# Patient Record
Sex: FEMALE | Race: WHITE
[De-identification: names, ages, dates, MRNs, and addresses within clinical notes are randomized per-mention and may not be internally consistent; named-entity substitution may affect disease eponyms.]

---

## 2017-09-06 ENCOUNTER — HOSPITAL ENCOUNTER (EMERGENCY)
Dept: HOSPITAL 17 - NEPD | Age: 57
Discharge: HOME | End: 2017-09-06
Payer: SELF-PAY

## 2017-09-06 VITALS — BODY MASS INDEX: 28.34 KG/M2 | HEIGHT: 66 IN | WEIGHT: 176.37 LBS

## 2017-09-06 VITALS
DIASTOLIC BLOOD PRESSURE: 66 MMHG | OXYGEN SATURATION: 98 % | HEART RATE: 86 BPM | TEMPERATURE: 98.4 F | SYSTOLIC BLOOD PRESSURE: 133 MMHG | RESPIRATION RATE: 20 BRPM

## 2017-09-06 VITALS
TEMPERATURE: 98.8 F | SYSTOLIC BLOOD PRESSURE: 127 MMHG | HEART RATE: 71 BPM | RESPIRATION RATE: 14 BRPM | OXYGEN SATURATION: 96 % | DIASTOLIC BLOOD PRESSURE: 62 MMHG

## 2017-09-06 DIAGNOSIS — R51: ICD-10-CM

## 2017-09-06 DIAGNOSIS — Z88.8: ICD-10-CM

## 2017-09-06 DIAGNOSIS — F32.9: ICD-10-CM

## 2017-09-06 DIAGNOSIS — F41.9: ICD-10-CM

## 2017-09-06 DIAGNOSIS — I10: ICD-10-CM

## 2017-09-06 DIAGNOSIS — K52.9: Primary | ICD-10-CM

## 2017-09-06 DIAGNOSIS — K21.9: ICD-10-CM

## 2017-09-06 LAB
ALP SERPL-CCNC: 80 U/L (ref 45–117)
ALT SERPL-CCNC: 28 U/L (ref 10–53)
ANION GAP SERPL CALC-SCNC: 9 MEQ/L (ref 5–15)
AST SERPL-CCNC: 39 U/L (ref 15–37)
BASOPHILS # BLD AUTO: 0 TH/MM3 (ref 0–0.2)
BASOPHILS NFR BLD: 1 % (ref 0–2)
BILIRUB SERPL-MCNC: 0.4 MG/DL (ref 0.2–1)
BUN SERPL-MCNC: 9 MG/DL (ref 7–18)
C. DIFF EPI 027: (no result)
CHLORIDE SERPL-SCNC: 112 MEQ/L (ref 98–107)
COLOR UR: YELLOW
COMMENT (UR): (no result)
CULTURE IF INDICATED: (no result)
EOSINOPHIL # BLD: 0.1 TH/MM3 (ref 0–0.4)
EOSINOPHIL NFR BLD: 3.3 % (ref 0–4)
ERYTHROCYTE [DISTWIDTH] IN BLOOD BY AUTOMATED COUNT: 13.6 % (ref 11.6–17.2)
GFR SERPLBLD BASED ON 1.73 SQ M-ARVRAT: 53 ML/MIN (ref 89–?)
GLUCOSE UR STRIP-MCNC: (no result) MG/DL
HCO3 BLD-SCNC: 19.5 MEQ/L (ref 21–32)
HCT VFR BLD CALC: 37.4 % (ref 35–46)
HEMO FLAGS: (no result)
HGB UR QL STRIP: (no result)
HYALINE CASTS #/AREA URNS LPF: 1 /LPF
KETONES UR STRIP-MCNC: 40 MG/DL
LYMPHOCYTES # BLD AUTO: 1 TH/MM3 (ref 1–4.8)
LYMPHOCYTES NFR BLD AUTO: 26.9 % (ref 9–44)
MCH RBC QN AUTO: 30 PG (ref 27–34)
MCHC RBC AUTO-ENTMCNC: 32.2 % (ref 32–36)
MCV RBC AUTO: 93.3 FL (ref 80–100)
MONOCYTES NFR BLD: 11.8 % (ref 0–8)
NEUTROPHILS # BLD AUTO: 2.2 TH/MM3 (ref 1.8–7.7)
NEUTROPHILS NFR BLD AUTO: 57 % (ref 16–70)
NITRITE UR QL STRIP: (no result)
PLATELET # BLD: 261 TH/MM3 (ref 150–450)
POTASSIUM SERPL-SCNC: 4.7 MEQ/L (ref 3.5–5.1)
RBC # BLD AUTO: 4.01 MIL/MM3 (ref 4–5.3)
SODIUM SERPL-SCNC: 140 MEQ/L (ref 136–145)
SP GR UR STRIP: 1.02 (ref 1–1.03)
SQUAMOUS #/AREA URNS HPF: 2 /HPF (ref 0–5)
TRANS CELLS #/AREA URNS HPF: 2 /HPF
WBC # BLD AUTO: 3.8 TH/MM3 (ref 4–11)

## 2017-09-06 PROCEDURE — 81001 URINALYSIS AUTO W/SCOPE: CPT

## 2017-09-06 PROCEDURE — 99284 EMERGENCY DEPT VISIT MOD MDM: CPT

## 2017-09-06 PROCEDURE — 83690 ASSAY OF LIPASE: CPT

## 2017-09-06 PROCEDURE — 96361 HYDRATE IV INFUSION ADD-ON: CPT

## 2017-09-06 PROCEDURE — 96375 TX/PRO/DX INJ NEW DRUG ADDON: CPT

## 2017-09-06 PROCEDURE — 96374 THER/PROPH/DIAG INJ IV PUSH: CPT

## 2017-09-06 PROCEDURE — 87493 C DIFF AMPLIFIED PROBE: CPT

## 2017-09-06 PROCEDURE — 85025 COMPLETE CBC W/AUTO DIFF WBC: CPT

## 2017-09-06 PROCEDURE — 80053 COMPREHEN METABOLIC PANEL: CPT

## 2017-09-06 NOTE — PD
HPI


Chief Complaint:  GI Complaint


Time Seen by Provider:  12:12


Travel History


International Travel<30 days:  No


Contact w/Intl Traveler<30days:  No


Traveled to known affect area:  No





History of Present Illness


HPI


This is a 56-year-old female who presents for evaluation nausea vomiting 

diarrhea.  Symptoms started 5 days ago.  Initially the primary symptoms were 

watery brown diarrhea, 6-7 episodes daily.  Since then she has developed 

vomiting which is now become dry heaving.  The diarrhea has improved and she 

now only has a little bit of diarrhea at a time.  She feels dehydrated.  She 

reports decreased urination.  She denies abdominal pain, fevers or chills, 

flank pain, chest pain or shortness of breath, cough or congestion, vaginal 

bleeding or discharge.  No sick contacts.  No dietary changes.  No recent 

travel.  No recent antibiotic use.  No other complaints.





PFSH


Past Medical History


Anxiety:  Yes


Depression:  Yes


Diabetes:  No


Diminished Hearing:  No


GERD:  Yes


Hypertension:  Yes


Musculoskeletal:  Yes (HERNIATED DISC-NECK)


Immunizations Current:  Yes


Ulcer:  Yes (HX OF BLEEDING ULCER)


Tetanus Vaccination:  Unknown


Pregnant?:  Not Pregnant


Menopausal:  Yes


:  7


Para:  4


Miscarriage:  3


Ectopic Pregnancy:  Yes


Ovarian Cysts:  Yes


Tubal Ligation:  Yes





Past Surgical History


Abdominal Surgery:  Yes (explor lap)


 Section:  No


Gynecologic Surgery:  Yes (AS NOTED IN GYN)


Hysterectomy:  No





Social History


Alcohol Use:  No


Tobacco Use:  No


Substance Use:  No





Allergies-Medications


(Allergen,Severity, Reaction):  


Coded Allergies:  


     diatrizoate meglumine (Unverified  Allergy, Intermediate, SWELLING, 17)


     gadobenic acid (Unverified  Allergy, Intermediate, SWELLING, 17)


     gadodiamide (Unverified  Allergy, Intermediate, SWELLING, 17)


     gadoteridol (Unverified  Allergy, Intermediate, SWELLING, 17)


     iodixanol (Unverified  Allergy, Intermediate, SWELLING, 17)


     iohexol (Unverified  Allergy, Intermediate, SWELLING, 17)


Reported Meds & Prescriptions





Reported Meds & Active Scripts


Active


Zofran (Ondansetron HCl) 4 Mg Tab 4 Mg PO Q6HR PRN








Review of Systems


Except as stated in HPI:  all other systems reviewed are Neg





Physical Exam


Narrative


GENERAL: Well-developed well-nourished female in no acute distress


SKIN: Warm and dry.


HEAD: Atraumatic. Normocephalic. 


EYES: Pupils equal and round. No scleral icterus. No injection or drainage. 


ENT: No nasal bleeding or discharge.  Mucous membranes pink and moist.


NECK: Trachea midline. No JVD. 


CARDIOVASCULAR: Regular rate and rhythm.  No murmur appreciated.


RESPIRATORY: No accessory muscle use. Clear to auscultation. Breath sounds 

equal bilaterally. 


GASTROINTESTINAL: Abdomen soft, non-tender, nondistended. Hepatic and splenic 

margins not palpable. 


MUSCULOSKELETAL: No obvious deformities.  No edema.


NEUROLOGICAL: Awake and alert. No obvious cranial nerve deficits.  Motor 

grossly within normal limits. Normal speech.


PSYCHIATRIC: Appropriate mood and affect; insight and judgment normal.





Data


Data


Last Documented VS





Vital Signs








  Date Time  Temp Pulse Resp B/P (MAP) Pulse Ox O2 Delivery O2 Flow Rate FiO2


 


17 12:57 98.8 71 14 127/62 (83) 96 Room Air  








Orders





 Orders


Complete Blood Count With Diff (17 12:22)


Comprehensive Metabolic Panel (17 12:22)


Lipase (17 12:22)


Urinalysis - C+S If Indicated (17 12:22)


Iv Access Insert/Monitor (17 12:22)


Ecg Monitoring (17 12:22)


Oximetry (17 12:22)


Ondansetron Inj (Zofran Inj) (17 12:30)


Sodium Chlor 0.9% 1000 Ml Inj (Ns 1000 M (17 12:22)


Sodium Chloride 0.9% Flush (Ns Flush) (17 12:30)


C Diff Toxin Pcr (17 12:22)


Enteric Path (Stool) (17 12:22)


Sodium Chlor 0.9% 1000 Ml Inj (Ns 1000 M (17 12:26)


Oral Rehydration (17 13:27)


Acetaminophen (Tylenol) (17 13:45)


Metoclopramide Inj (Reglan Inj) (17 14:00)


Tramadol (Ultram) (17 14:15)





Labs





Laboratory Tests








Test


  9/6/17


12:34 17


12:35 17


12:55


 


White Blood Count 3.8 TH/MM3   


 


Red Blood Count 4.01 MIL/MM3   


 


Hemoglobin 12.0 GM/DL   


 


Hematocrit 37.4 %   


 


Mean Corpuscular Volume 93.3 FL   


 


Mean Corpuscular Hemoglobin 30.0 PG   


 


Mean Corpuscular Hemoglobin


Concent 32.2 % 


  


  


 


 


Red Cell Distribution Width 13.6 %   


 


Platelet Count 261 TH/MM3   


 


Mean Platelet Volume 8.6 FL   


 


Neutrophils (%) (Auto) 57.0 %   


 


Lymphocytes (%) (Auto) 26.9 %   


 


Monocytes (%) (Auto) 11.8 %   


 


Eosinophils (%) (Auto) 3.3 %   


 


Basophils (%) (Auto) 1.0 %   


 


Neutrophils # (Auto) 2.2 TH/MM3   


 


Lymphocytes # (Auto) 1.0 TH/MM3   


 


Monocytes # (Auto) 0.4 TH/MM3   


 


Eosinophils # (Auto) 0.1 TH/MM3   


 


Basophils # (Auto) 0.0 TH/MM3   


 


CBC Comment DIFF FINAL   


 


Differential Comment    


 


Blood Urea Nitrogen 9 MG/DL   


 


Creatinine 1.08 MG/DL   


 


Random Glucose 98 MG/DL   


 


Total Protein 7.4 GM/DL   


 


Albumin 3.6 GM/DL   


 


Calcium Level 8.7 MG/DL   


 


Alkaline Phosphatase 80 U/L   


 


Aspartate Amino Transf


(AST/SGOT) 39 U/L 


  


  


 


 


Alanine Aminotransferase


(ALT/SGPT) 28 U/L 


  


  


 


 


Total Bilirubin 0.4 MG/DL   


 


Sodium Level 140 MEQ/L   


 


Potassium Level 4.7 MEQ/L   


 


Chloride Level 112 MEQ/L   


 


Carbon Dioxide Level 19.5 MEQ/L   


 


Anion Gap 9 MEQ/L   


 


Estimat Glomerular Filtration


Rate 53 ML/MIN 


  


  


 


 


Lipase 205 U/L   


 


Urine Color   YELLOW 


 


Urine Turbidity   HAZY 


 


Urine pH   6.5 


 


Urine Specific Gravity   1.016 


 


Urine Protein   NEG mg/dL 


 


Urine Glucose (UA)   NEG mg/dL 


 


Urine Ketones   40 mg/dL 


 


Urine Occult Blood   TRACE 


 


Urine Nitrite   NEG 


 


Urine Bilirubin   NEG 


 


Urine Urobilinogen


  


  


  LESS THAN 2.0


MG/DL


 


Urine Leukocyte Esterase   LARGE 


 


Urine RBC   1 /hpf 


 


Urine WBC   2 /hpf 


 


Urine Squamous Epithelial


Cells 


  


  2 /hpf 


 


 


Urine Transitional Epithelial


Cells 


  


  2 /hpf 


 


 


Urine Hyaline Casts   1 /lpf 


 


Microscopic Urinalysis Comment


  


  


  CULT NOT


INDICATED











MDM


Medical Decision Making


Medical Screen Exam Complete:  Yes


Emergency Medical Condition:  Yes


Medical Record Reviewed:  Yes


Differential Diagnosis


Gastroenteritis, colitis, diverticulitis, dehydration, electrolyte abnormality


Narrative Course


56-year-old female here for evaluation of 5 days of nausea vomiting and 

diarrhea.  Her abdomen is soft and nontender.  She is afebrile, not 

tachycardic.  Plan is for basic lab work.  She'll be given 2 L of IV fluids and 

4 mg of Zofran.


Additional Reglan dosage has been ordered.





Upon recheck the patient does feel improved.  Her nausea has significantly 

improved.  She was able to tolerate some oral rehydration.  She requested 

Tylenol for headache.  The patient's C. difficile and stool cultures are 

currently pending.  Her lab work is found to be very reassuring.  Suspect a 

viral gastroenteritis.  The patient will be discharged with prescription for 

Zofran.  We discussed signs and symptoms that weren't returning to the 

emergency room.  She is stable for discharge.  Prior to discharge I received a 

call from microbiology stating that they do not have enough stool specimen to 

perform the stool culture.  They do have enough for the C. difficile PCR.  The 

patient is unable to provide any additional stool at this time.  She is 

encouraged to follow-up with her primary care physician for stool culture if 

symptoms persist.





Diagnosis





 Primary Impression:  


 Gastroenteritis


Departure Forms:  Tests/Procedures, Work Release   Enter return to work date:  

Sep 8, 2017





***Additional Instructions:  


Medication as prescribed.  Slowly advance diet as tolerated.


As discussed, return for any acutely new or worsening symptoms such as 

abdominal pain, fevers, persistent uncontrolled vomiting/diarrhea, dehydration, 

dizziness, severe weakness.


***Med/Other Pt SpecificInfo:  Prescription(s) given


Scripts


Ondansetron (Zofran) 4 Mg Tab


4 MG PO Q6HR Y for NAUSEA OR VOMITING, #20 TAB 0 Refills


   Prov: Mitch Cannon MD         17


Disposition:  01 DISCHARGE HOME


Condition:  Stable











Christian Hussein Sep 6, 2017 12:28

## 2018-01-05 ENCOUNTER — HOSPITAL ENCOUNTER (EMERGENCY)
Dept: HOSPITAL 17 - PHED | Age: 58
Discharge: HOME | End: 2018-01-05
Payer: COMMERCIAL

## 2018-01-05 VITALS
DIASTOLIC BLOOD PRESSURE: 67 MMHG | SYSTOLIC BLOOD PRESSURE: 103 MMHG | OXYGEN SATURATION: 98 % | HEART RATE: 76 BPM | RESPIRATION RATE: 18 BRPM

## 2018-01-05 VITALS — RESPIRATION RATE: 18 BRPM

## 2018-01-05 VITALS — WEIGHT: 156.53 LBS | BODY MASS INDEX: 26.08 KG/M2 | HEIGHT: 65 IN

## 2018-01-05 VITALS
RESPIRATION RATE: 16 BRPM | DIASTOLIC BLOOD PRESSURE: 58 MMHG | SYSTOLIC BLOOD PRESSURE: 113 MMHG | TEMPERATURE: 97.9 F | OXYGEN SATURATION: 98 % | HEART RATE: 103 BPM

## 2018-01-05 VITALS
RESPIRATION RATE: 18 BRPM | DIASTOLIC BLOOD PRESSURE: 65 MMHG | HEART RATE: 77 BPM | OXYGEN SATURATION: 98 % | SYSTOLIC BLOOD PRESSURE: 105 MMHG

## 2018-01-05 DIAGNOSIS — F32.9: ICD-10-CM

## 2018-01-05 DIAGNOSIS — Z88.8: ICD-10-CM

## 2018-01-05 DIAGNOSIS — F41.9: ICD-10-CM

## 2018-01-05 DIAGNOSIS — Z79.899: ICD-10-CM

## 2018-01-05 DIAGNOSIS — I10: ICD-10-CM

## 2018-01-05 DIAGNOSIS — B34.9: Primary | ICD-10-CM

## 2018-01-05 LAB
BASOPHILS # BLD AUTO: 0.1 TH/MM3 (ref 0–0.2)
BASOPHILS NFR BLD: 0.9 % (ref 0–2)
BUN SERPL-MCNC: 15 MG/DL (ref 7–18)
CALCIUM SERPL-MCNC: 8.7 MG/DL (ref 8.5–10.1)
CHLORIDE SERPL-SCNC: 108 MEQ/L (ref 98–107)
CREAT SERPL-MCNC: 1.1 MG/DL (ref 0.5–1)
EOSINOPHIL # BLD: 0.1 TH/MM3 (ref 0–0.4)
EOSINOPHIL NFR BLD: 1.8 % (ref 0–4)
ERYTHROCYTE [DISTWIDTH] IN BLOOD BY AUTOMATED COUNT: 14.6 % (ref 11.6–17.2)
GFR SERPLBLD BASED ON 1.73 SQ M-ARVRAT: 51 ML/MIN (ref 89–?)
GLUCOSE SERPL-MCNC: 122 MG/DL (ref 74–106)
HCO3 BLD-SCNC: 17.4 MEQ/L (ref 21–32)
HCT VFR BLD CALC: 44.3 % (ref 35–46)
HGB BLD-MCNC: 13.9 GM/DL (ref 11.6–15.3)
LYMPHOCYTES # BLD AUTO: 1.3 TH/MM3 (ref 1–4.8)
LYMPHOCYTES NFR BLD AUTO: 19.1 % (ref 9–44)
MCH RBC QN AUTO: 28.9 PG (ref 27–34)
MCHC RBC AUTO-ENTMCNC: 31.3 % (ref 32–36)
MCV RBC AUTO: 92.4 FL (ref 80–100)
MONOCYTE #: 0.7 TH/MM3 (ref 0–0.9)
MONOCYTES NFR BLD: 10 % (ref 0–8)
NEUTROPHILS # BLD AUTO: 4.4 TH/MM3 (ref 1.8–7.7)
NEUTROPHILS NFR BLD AUTO: 68.2 % (ref 16–70)
PLATELET # BLD: 272 TH/MM3 (ref 150–450)
PMV BLD AUTO: 8.6 FL (ref 7–11)
RBC # BLD AUTO: 4.79 MIL/MM3 (ref 4–5.3)
SODIUM SERPL-SCNC: 139 MEQ/L (ref 136–145)
WBC # BLD AUTO: 6.6 TH/MM3 (ref 4–11)

## 2018-01-05 PROCEDURE — 80048 BASIC METABOLIC PNL TOTAL CA: CPT

## 2018-01-05 PROCEDURE — 96376 TX/PRO/DX INJ SAME DRUG ADON: CPT

## 2018-01-05 PROCEDURE — 96361 HYDRATE IV INFUSION ADD-ON: CPT

## 2018-01-05 PROCEDURE — 96374 THER/PROPH/DIAG INJ IV PUSH: CPT

## 2018-01-05 PROCEDURE — 96375 TX/PRO/DX INJ NEW DRUG ADDON: CPT

## 2018-01-05 PROCEDURE — 85025 COMPLETE CBC W/AUTO DIFF WBC: CPT

## 2018-01-05 PROCEDURE — 99284 EMERGENCY DEPT VISIT MOD MDM: CPT

## 2018-01-05 NOTE — PD
HPI


Chief Complaint:  GI Complaint


Time Seen by Provider:  07:49


Travel History


International Travel<30 days:  No


Contact w/Intl Traveler<30days:  No


Traveled to known affect area:  No





History of Present Illness


HPI


The patient was seen and examined in the presence of the nurse.  This patient 

complains of nausea and vomiting.  She is worried about being dehydrated.  She 

has generalized weakness.  She says she can't keep anything down.  Duration 10 

days.  She had diarrhea but that resolved.  She's got runny nose congestion 

cough.  No alleviating factors.  No exacerbating factors.  Symptom severity is 

moderate





PFSH


Past Medical History


Hx Anticoagulant Therapy:  No


Anxiety:  Yes


Depression:  Yes


Cardiovascular Problems:  Yes (HTN)


Diabetes:  No


Diminished Hearing:  No


GERD:  Yes


Hypertension:  Yes


Musculoskeletal:  Yes (HERNIATED DISC-NECK)


Immunizations Current:  Yes


Ulcer:  Yes (HX OF BLEEDING ULCER)


Influenza Vaccination:  No


Pregnant?:  Not Pregnant


Menopausal:  Yes


:  7


Para:  4


Miscarriage:  3


Ectopic Pregnancy:  Yes


Ovarian Cysts:  Yes


Tubal Ligation:  Yes





Past Surgical History


Abdominal Surgery:  Yes (explor lap)


 Section:  No


Gynecologic Surgery:  Yes (AS NOTED IN GYN)


Hysterectomy:  No





Social History


Alcohol Use:  No


Tobacco Use:  No


Substance Use:  No





Allergies-Medications


(Allergen,Severity, Reaction):  


Coded Allergies:  


     diatrizoate meglumine (Unverified  Allergy, Intermediate, SWELLING, 18)


     gadobenic acid (Unverified  Allergy, Intermediate, SWELLING, 18)


     gadodiamide (Unverified  Allergy, Intermediate, SWELLING, 18)


     gadoteridol (Unverified  Allergy, Intermediate, SWELLING, 18)


     iodixanol (Unverified  Allergy, Intermediate, SWELLING, 18)


     iohexol (Unverified  Allergy, Intermediate, SWELLING, 18)


Reported Meds & Prescriptions





Reported Meds & Active Scripts


Active


Reported


Lisinopril 20 Mg Tab 20 Mg PO DAILY


Wellbutrin Xl 24 HR (Bupropion HCl) 300 Mg Tab 300 Mg PO DAILY








Review of Systems


General / Constitutional:  No: Fever


Eyes:  No: Visual changes


HENT:  No: Headaches


Cardiovascular:  No: Chest Pain or Discomfort


Respiratory:  No: Shortness of Breath


Gastrointestinal:  Positive: Nausea, Vomiting, Diarrhea, No: Abdominal Pain


Genitourinary:  No: Dysuria


Musculoskeletal:  Positive: Weakness, No: Pain


Skin:  No Rash


Neurologic:  Positive: Weakness


Psychiatric:  No: Depression


Endocrine:  No: Polydipsia


Hematologic/Lymphatic:  No: Easy Bruising





Physical Exam


Narrative


GENERAL: Well-nourished, well-developed patient in no apparent distress.


SKIN: Focused skin assessment reveals no rash and nodules. Skin is Warm and dry.


HEAD: Atraumatic. Normocephalic. 


EYES: Pupils equal and round. No scleral icterus. No injection or drainage. 


ENT: No nasal bleeding or discharge.  Mucous membranes pink and moist.


NECK: Trachea midline. No JVD. 


CARDIOVASCULAR: Regular rate and rhythm.  No murmur appreciated.


RESPIRATORY: No accessory muscle use. Clear to auscultation. Breath sounds 

equal bilaterally. 


GASTROINTESTINAL: Abdomen soft, non-tender, nondistended. Hepatic and splenic 

margins not palpable. 


MUSCULOSKELETAL: No obvious deformities. No clubbing.  No cyanosis.  No edema. 


NEUROLOGICAL: Awake and alert. No obvious cranial nerve deficits.  Motor 

grossly within normal limits. Normal speech.


PSYCHIATRIC: Appropriate mood and affect; insight and judgment normal.





Data


Data


Last Documented VS





Vital Signs








  Date Time  Temp Pulse Resp B/P (MAP) Pulse Ox O2 Delivery O2 Flow Rate FiO2


 


18 10:55   18     


 


18 10:52  76  103/67 (79) 98 Room Air  


 


18 07:37 97.9       








Orders





 Orders


Basic Metabolic Panel (Bmp) (18 07:53)


Complete Blood Count With Diff (18 07:53)


Iv Access Insert/Monitor (18 07:53)


Sodium Chloride 0.9% Flush (Ns Flush) (18 08:00)


Ondansetron Inj (Zofran Inj) (18 08:00)


Sodium Chlor 0.9% 1000 Ml Inj (Ns 1000 M (18 08:00)


Ondansetron Inj (Zofran Inj) (18 10:00)


Ketorolac Inj (Toradol Inj) (18 10:00)





Labs





Laboratory Tests








Test


  18


07:55


 


White Blood Count 6.6 TH/MM3 


 


Red Blood Count 4.79 MIL/MM3 


 


Hemoglobin 13.9 GM/DL 


 


Hematocrit 44.3 % 


 


Mean Corpuscular Volume 92.4 FL 


 


Mean Corpuscular Hemoglobin 28.9 PG 


 


Mean Corpuscular Hemoglobin


Concent 31.3 % 


 


 


Red Cell Distribution Width 14.6 % 


 


Platelet Count 272 TH/MM3 


 


Mean Platelet Volume 8.6 FL 


 


Neutrophils (%) (Auto) 68.2 % 


 


Lymphocytes (%) (Auto) 19.1 % 


 


Monocytes (%) (Auto) 10.0 % 


 


Eosinophils (%) (Auto) 1.8 % 


 


Basophils (%) (Auto) 0.9 % 


 


Neutrophils # (Auto) 4.4 TH/MM3 


 


Lymphocytes # (Auto) 1.3 TH/MM3 


 


Monocytes # (Auto) 0.7 TH/MM3 


 


Eosinophils # (Auto) 0.1 TH/MM3 


 


Basophils # (Auto) 0.1 TH/MM3 


 


CBC Comment DIFF FINAL 


 


Differential Comment  


 


Blood Urea Nitrogen 15 MG/DL 


 


Creatinine 1.10 MG/DL 


 


Random Glucose 122 MG/DL 


 


Calcium Level 8.7 MG/DL 


 


Sodium Level 139 MEQ/L 


 


Potassium Level 3.5 MEQ/L 


 


Chloride Level 108 MEQ/L 


 


Carbon Dioxide Level 17.4 MEQ/L 


 


Anion Gap 14 MEQ/L 


 


Estimat Glomerular Filtration


Rate 51 ML/MIN 


 











MDM


Medical Decision Making


Medical Screen Exam Complete:  Yes


Emergency Medical Condition:  Yes


Medical Record Reviewed:  Yes


Differential Diagnosis


Flu syndrome, dehydration, gastroenteritis, colitis


Narrative Course


I have reviewed the patient's electronic medical record.





IV placed


I gave her IV Zofran and 1 L normal saline IV bolus


CBC is normal


Metabolic profile is normal





I gave her a second dose of Zofran and a dose of Toradol.


Zofran prescribed.





Patient stable for outpatient follow-up.





Diagnosis





 Primary Impression:  


 Acute viral syndrome


 Additional Impressions:  


 Nausea and vomiting


 Qualified Codes:  R11.2 - Nausea with vomiting, unspecified


 Generalized weakness





***Additional Instructions:  


The patient was advised to follow up with their physician and return if they 

worsen.


I have recommended clear liquids for 24 hours, then gradually advance as 

tolerated.


***Med/Other Pt SpecificInfo:  Prescription(s) given


Scripts


Ondansetron (Zofran) 4 Mg Tab


4 MG PO Q6HR Y for NAUSEA OR VOMITING, #20 TAB 0 Refills


   Prov: Salvador Diop MD         18


Disposition:  01 DISCHARGE HOME


Condition:  Stable











Salvador Diop MD 2018 07:57

## 2018-04-07 ENCOUNTER — HOSPITAL ENCOUNTER (EMERGENCY)
Dept: HOSPITAL 17 - NEPC | Age: 58
LOS: 1 days | Discharge: HOME | End: 2018-04-08
Payer: COMMERCIAL

## 2018-04-07 VITALS
HEART RATE: 76 BPM | TEMPERATURE: 97.6 F | DIASTOLIC BLOOD PRESSURE: 48 MMHG | RESPIRATION RATE: 16 BRPM | SYSTOLIC BLOOD PRESSURE: 102 MMHG | OXYGEN SATURATION: 100 %

## 2018-04-07 VITALS — WEIGHT: 154.32 LBS | HEIGHT: 66 IN | BODY MASS INDEX: 24.8 KG/M2

## 2018-04-07 VITALS — SYSTOLIC BLOOD PRESSURE: 106 MMHG | RESPIRATION RATE: 18 BRPM | DIASTOLIC BLOOD PRESSURE: 57 MMHG

## 2018-04-07 VITALS — OXYGEN SATURATION: 100 %

## 2018-04-07 DIAGNOSIS — R55: ICD-10-CM

## 2018-04-07 DIAGNOSIS — F41.9: ICD-10-CM

## 2018-04-07 DIAGNOSIS — R00.1: ICD-10-CM

## 2018-04-07 DIAGNOSIS — K21.9: ICD-10-CM

## 2018-04-07 DIAGNOSIS — E86.0: Primary | ICD-10-CM

## 2018-04-07 DIAGNOSIS — R51: ICD-10-CM

## 2018-04-07 DIAGNOSIS — R42: ICD-10-CM

## 2018-04-07 DIAGNOSIS — F32.9: ICD-10-CM

## 2018-04-07 DIAGNOSIS — I10: ICD-10-CM

## 2018-04-07 LAB
ALBUMIN SERPL-MCNC: 3.5 GM/DL (ref 3.4–5)
ALP SERPL-CCNC: 76 U/L (ref 45–117)
ALT SERPL-CCNC: 16 U/L (ref 10–53)
AST SERPL-CCNC: 15 U/L (ref 15–37)
BASOPHILS # BLD AUTO: 0.1 TH/MM3 (ref 0–0.2)
BASOPHILS NFR BLD: 1 % (ref 0–2)
BILIRUB SERPL-MCNC: 0.3 MG/DL (ref 0.2–1)
BUN SERPL-MCNC: 23 MG/DL (ref 7–18)
CALCIUM SERPL-MCNC: 8.5 MG/DL (ref 8.5–10.1)
CHLORIDE SERPL-SCNC: 109 MEQ/L (ref 98–107)
COLOR UR: YELLOW
CREAT SERPL-MCNC: 1.16 MG/DL (ref 0.5–1)
EOSINOPHIL # BLD: 0.3 TH/MM3 (ref 0–0.4)
EOSINOPHIL NFR BLD: 4.9 % (ref 0–4)
ERYTHROCYTE [DISTWIDTH] IN BLOOD BY AUTOMATED COUNT: 13.4 % (ref 11.6–17.2)
GFR SERPLBLD BASED ON 1.73 SQ M-ARVRAT: 48 ML/MIN (ref 89–?)
GLUCOSE SERPL-MCNC: 82 MG/DL (ref 74–106)
GLUCOSE UR STRIP-MCNC: (no result) MG/DL
HCO3 BLD-SCNC: 19 MEQ/L (ref 21–32)
HCT VFR BLD CALC: 36.3 % (ref 35–46)
HGB BLD-MCNC: 12 GM/DL (ref 11.6–15.3)
HGB UR QL STRIP: (no result)
HYALINE CASTS #/AREA URNS LPF: 16 /LPF
INR PPP: 1.1 RATIO
KETONES UR STRIP-MCNC: (no result) MG/DL
LYMPHOCYTES # BLD AUTO: 1.8 TH/MM3 (ref 1–4.8)
LYMPHOCYTES NFR BLD AUTO: 33.8 % (ref 9–44)
MCH RBC QN AUTO: 31.4 PG (ref 27–34)
MCHC RBC AUTO-ENTMCNC: 33 % (ref 32–36)
MCV RBC AUTO: 95 FL (ref 80–100)
MONOCYTE #: 0.6 TH/MM3 (ref 0–0.9)
MONOCYTES NFR BLD: 11.4 % (ref 0–8)
NEUTROPHILS # BLD AUTO: 2.6 TH/MM3 (ref 1.8–7.7)
NEUTROPHILS NFR BLD AUTO: 48.9 % (ref 16–70)
NITRITE UR QL STRIP: (no result)
PLATELET # BLD: 241 TH/MM3 (ref 150–450)
PMV BLD AUTO: 9.3 FL (ref 7–11)
PROT SERPL-MCNC: 6.7 GM/DL (ref 6.4–8.2)
PROTHROMBIN TIME: 10.7 SEC (ref 9.8–11.6)
RBC # BLD AUTO: 3.82 MIL/MM3 (ref 4–5.3)
SODIUM SERPL-SCNC: 139 MEQ/L (ref 136–145)
SP GR UR STRIP: 1.03 (ref 1–1.03)
SQUAMOUS #/AREA URNS HPF: 2 /HPF (ref 0–5)
TROPONIN I SERPL-MCNC: (no result) NG/ML (ref 0.02–0.05)
URINE LEUKOCYTE ESTERASE: (no result)
WBC # BLD AUTO: 5.2 TH/MM3 (ref 4–11)

## 2018-04-07 PROCEDURE — 96374 THER/PROPH/DIAG INJ IV PUSH: CPT

## 2018-04-07 PROCEDURE — 85025 COMPLETE CBC W/AUTO DIFF WBC: CPT

## 2018-04-07 PROCEDURE — 70450 CT HEAD/BRAIN W/O DYE: CPT

## 2018-04-07 PROCEDURE — 93005 ELECTROCARDIOGRAM TRACING: CPT

## 2018-04-07 PROCEDURE — 81001 URINALYSIS AUTO W/SCOPE: CPT

## 2018-04-07 PROCEDURE — 99285 EMERGENCY DEPT VISIT HI MDM: CPT

## 2018-04-07 PROCEDURE — 85730 THROMBOPLASTIN TIME PARTIAL: CPT

## 2018-04-07 PROCEDURE — 85610 PROTHROMBIN TIME: CPT

## 2018-04-07 PROCEDURE — 96361 HYDRATE IV INFUSION ADD-ON: CPT

## 2018-04-07 PROCEDURE — 80053 COMPREHEN METABOLIC PANEL: CPT

## 2018-04-07 PROCEDURE — 84484 ASSAY OF TROPONIN QUANT: CPT

## 2018-04-07 NOTE — RADRPT
EXAM DATE/TIME:  04/07/2018 22:30 

 

[facility] COMPARISON:     

No previous studies available for comparison.

 

 

INDICATIONS :     

Cephalgia.

                      

 

RADIATION DOSE:     

56.35 CTDIvol (mGy) 

 

 

 

MEDICAL HISTORY :     

Cardiovascular disease. Hypertension. 

 

SURGICAL HISTORY :      

None. 

 

ENCOUNTER:      

Initial

 

ACUITY:      

1 day

 

PAIN SCALE:      

5/10

 

LOCATION:        

cranial 

 

TECHNIQUE:     

Multiple contiguous axial images were obtained of the head.  Using automated exposure control and adj
ustment of the mA and/or kV according to patient size, radiation dose was kept as low as reasonably a
chievable to obtain optimal diagnostic quality images.   DICOM format image data is available electro
nically for review and comparison.  

 

FINDINGS:     

 

CEREBRUM:     

The ventricles are normal for age.  No evidence of midline shift, mass lesion, hemorrhage or acute in
farction.  No extra-axial fluid collections are seen.

 

POSTERIOR FOSSA:     

The cerebellum and brainstem are intact.  The 4th ventricle is midline.  The cerebellopontine angle i
s unremarkable.

 

EXTRACRANIAL:     

The visualized portion of the orbits is intact.

 

SKULL:     

The calvaria is intact.  No evidence of skull fracture.

 

CONCLUSION:     

No acute disease.  

 

 

 

 Kike Spangler MD on April 07, 2018 at 22:52           

Board Certified Radiologist.

 This report was verified electronically.

## 2018-04-08 NOTE — PD
HPI


Chief Complaint:  Headache


Time Seen by Provider:  21:42


Travel History


International Travel<30 days:  No


Contact w/Intl Traveler<30days:  No


Traveled to known affect area:  No





History of Present Illness


HPI


Patient is a 57 year old female who comes in complaining of headache with a 

near syncopal episode.  She says she was at work when she felt lightheaded and 

then developed a slight headache.  She says she sat down and had something to 

eat and felt a little bit better, but she was told to go home and that she 

needed to come to the emergency department.  She says that when they took her 

blood pressure, it was low.  She does take lisinopril daily, but says she 

recently lost a lot of weight.  She denies any chest pain, shortness of breath.

  She denies blurred vision, nausea or vomiting.  She denies fever chills.  

Severity is mild to moderate.





PFSH


Past Medical History


Hx Anticoagulant Therapy:  No


Anxiety:  Yes


Depression:  Yes


Cardiovascular Problems:  Yes (HTN)


Diabetes:  No


Diminished Hearing:  No


GERD:  Yes


Hypertension:  Yes


Musculoskeletal:  Yes (HERNIATED DISC-NECK)


Immunizations Current:  Yes


Ulcer:  Yes (HX OF BLEEDING ULCER)


Pregnant?:  Not Pregnant


Menopausal:  Yes


:  7


Para:  4


Miscarriage:  3


Ectopic Pregnancy:  Yes


Ovarian Cysts:  Yes


Tubal Ligation:  Yes





Past Surgical History


Abdominal Surgery:  Yes (explor lap)


 Section:  No


Gynecologic Surgery:  Yes (AS NOTED IN GYN)


Hysterectomy:  No





Social History


Alcohol Use:  No


Tobacco Use:  No


Substance Use:  No





Allergies-Medications


(Allergen,Severity, Reaction):  


Coded Allergies:  


     diatrizoate meglumine (Unverified  Allergy, Intermediate, SWELLING, 18)


     gadobenic acid (Unverified  Allergy, Intermediate, SWELLING, 18)


     gadodiamide (Unverified  Allergy, Intermediate, SWELLING, 18)


     gadoteridol (Unverified  Allergy, Intermediate, SWELLING, 18)


     iodixanol (Unverified  Allergy, Intermediate, SWELLING, 18)


     iohexol (Unverified  Allergy, Intermediate, SWELLING, 18)


Reported Meds & Prescriptions





Reported Meds & Active Scripts


Active


Zofran (Ondansetron HCl) 4 Mg Tab 4 Mg PO Q6HR PRN


Reported


Lisinopril 20 Mg Tab 20 Mg PO DAILY


Wellbutrin Xl 24 HR (Bupropion HCl) 300 Mg Tab 300 Mg PO DAILY








Review of Systems


Except as stated in HPI:  all other systems reviewed are Neg


General / Constitutional:  No: Fever, Chills


Eyes:  No: Blurred Vision


HENT:  Positive: Headaches, Lightheadedness


Cardiovascular:  No: Chest Pain or Discomfort


Respiratory:  No: Shortness of Breath


Gastrointestinal:  No: Nausea, Vomiting


Musculoskeletal:  No: Myalgias, Edema


Skin:  No Rash, No Change in Pigmentation


Neurologic:  No: Weakness, Syncope





Physical Exam


Narrative


GENERAL: Awake and alert, in no acute distress.


SKIN: Focused skin assessment warm/dry.


HEAD: Atraumatic. Normocephalic. 


EYES: Pupils equal and round. No scleral icterus.  Extraocular movements intact.


ENT: Mucous membranes pink and moist.


NECK: Trachea midline. No JVD. 


CARDIOVASCULAR: Regular rate and rhythm.  No murmur appreciated.


RESPIRATORY: No accessory muscle use. Clear to auscultation. Breath sounds 

equal bilaterally. 


GASTROINTESTINAL: Abdomen soft, non-tender, nondistended. 


MUSCULOSKELETAL: No obvious deformities. No clubbing.  No cyanosis.  No edema. 


NEUROLOGICAL: Awake and alert. No obvious cranial nerve deficits.  Motor 

grossly within normal limits. Normal speech.


PSYCHIATRIC: Appropriate mood and affect; insight and judgment normal.





Data


Data


Last Documented VS





Vital Signs








  Date Time  Temp Pulse Resp B/P (MAP) Pulse Ox O2 Delivery O2 Flow Rate FiO2


 


18 21:54  63 18 101/54 (70)    





  65 18 104/57 (73)    





  62 18 106/61 (76)    


 


18 21:53     100 Room Air  


 


18 21:20 97.6       








Orders





 Orders


Electrocardiogram (18 21:43)


Complete Blood Count With Diff (18 21:43)


Comprehensive Metabolic Panel (18 21:43)


Troponin I (18 21:43)


Act Partial Throm Time (Ptt) (18 21:43)


Prothrombin Time / Inr (Pt) (18 21:43)


Urinalysis - C+S If Indicated (18 21:43)


Ct Brain W/O Iv Contrast(Rout) (18 21:43)


Ecg Monitoring (18 21:43)


Iv Access Insert/Monitor (18 21:43)


Oximetry (18 21:43)


Sodium Chloride 0.9% Flush (Ns Flush) (18 21:45)


Sodium Chlor 0.9% 1000 Ml Inj (Ns 1000 M (18 21:43)


Ketorolac Inj (Toradol Inj) (18 21:45)





Labs





Laboratory Tests








Test


  18


22:11 18


22:24


 


White Blood Count 5.2 TH/MM3  


 


Red Blood Count 3.82 MIL/MM3  


 


Hemoglobin 12.0 GM/DL  


 


Hematocrit 36.3 %  


 


Mean Corpuscular Volume 95.0 FL  


 


Mean Corpuscular Hemoglobin 31.4 PG  


 


Mean Corpuscular Hemoglobin


Concent 33.0 % 


  


 


 


Red Cell Distribution Width 13.4 %  


 


Platelet Count 241 TH/MM3  


 


Mean Platelet Volume 9.3 FL  


 


Neutrophils (%) (Auto) 48.9 %  


 


Lymphocytes (%) (Auto) 33.8 %  


 


Monocytes (%) (Auto) 11.4 %  


 


Eosinophils (%) (Auto) 4.9 %  


 


Basophils (%) (Auto) 1.0 %  


 


Neutrophils # (Auto) 2.6 TH/MM3  


 


Lymphocytes # (Auto) 1.8 TH/MM3  


 


Monocytes # (Auto) 0.6 TH/MM3  


 


Eosinophils # (Auto) 0.3 TH/MM3  


 


Basophils # (Auto) 0.1 TH/MM3  


 


CBC Comment DIFF FINAL  


 


Differential Comment   


 


Prothrombin Time 10.7 SEC  


 


Prothromb Time International


Ratio 1.1 RATIO 


  


 


 


Activated Partial


Thromboplast Time 23.6 SEC 


  


 


 


Blood Urea Nitrogen 23 MG/DL  


 


Creatinine 1.16 MG/DL  


 


Random Glucose 82 MG/DL  


 


Total Protein 6.7 GM/DL  


 


Albumin 3.5 GM/DL  


 


Calcium Level 8.5 MG/DL  


 


Alkaline Phosphatase 76 U/L  


 


Aspartate Amino Transf


(AST/SGOT) 15 U/L 


  


 


 


Alanine Aminotransferase


(ALT/SGPT) 16 U/L 


  


 


 


Total Bilirubin 0.3 MG/DL  


 


Sodium Level 139 MEQ/L  


 


Potassium Level 4.4 MEQ/L  


 


Chloride Level 109 MEQ/L  


 


Carbon Dioxide Level 19.0 MEQ/L  


 


Anion Gap 11 MEQ/L  


 


Estimat Glomerular Filtration


Rate 48 ML/MIN 


  


 


 


Troponin I


  LESS THAN 0.02


NG/ML 


 


 


Urine Color  YELLOW 


 


Urine Turbidity  CLEAR 


 


Urine pH  5.5 


 


Urine Specific Gravity  1.028 


 


Urine Protein  TRACE mg/dL 


 


Urine Glucose (UA)  NEG mg/dL 


 


Urine Ketones  NEG mg/dL 


 


Urine Occult Blood  NEG 


 


Urine Nitrite  NEG 


 


Urine Bilirubin  NEG 


 


Urine Urobilinogen


  


  LESS THAN 2.0


MG/DL


 


Urine Leukocyte Esterase  SMALL 


 


Urine RBC  1 /hpf 


 


Urine WBC  6 /hpf 


 


Urine Squamous Epithelial


Cells 


  2 /hpf 


 


 


Urine Hyaline Casts  16 /lpf 


 


Microscopic Urinalysis Comment


  


  CULT NOT


INDICATED











MDM


Medical Decision Making


Medical Screen Exam Complete:  Yes


Emergency Medical Condition:  Yes


Medical Record Reviewed:  Yes


Interpretation(s)


ECG shows sinus rhythm at a rate of 55, no ST elevation or depression, normal 

intervals.


Differential Diagnosis


Dehydration versus electrolyte abnormality versus migraine


Narrative Course


Patient is a 57-year-old female comes in after an episode of dizziness and 

headache at work.  Exam shows no neurologic abnormalities.  IV established, 

labs sent.  Labs show no acute abnormalities.  CT of the head performed shows 

no acute abnormalities.  Patient given IV fluids and Toradol.  She reports 

feeling better.  She will be discharged home.  Advised to increase her fluid 

intake.  Advised take Tylenol or ibuprofen as needed for pain.  Advised return 

to the ED as needed for any worsening symptoms.


Last 24 hours Impressions








Head CT [id number] Signed





Impressions: 





 Service Date/Time:  2018 22:30 - CONCLUSION:  No acute 





 disease.       Kike Spangler MD 











Diagnosis





 Primary Impression:  


 Dehydration


 Additional Impression:  


 Dizziness


Patient Instructions:  Dizziness (ED), General Instructions





***Additional Instructions:  


Increase her fluid intake.  Follow-up with a primary care doctor.  Return to 

the ED as needed for any worsening symptoms.


Disposition:   DISCHARGE HOME


Condition:  Stable











Chantelle Montes MD 2018 00:24

## 2018-04-08 NOTE — EKG
Date Performed: 04/07/2018       Time Performed: 21:51:35

 

PTAGE:      57 years

 

EKG:      SINUS BRADYCARDIA Compared to previous tracing, HR is slower, ST-T changes have resolved, Q
 waves inferiorly less prominent. BORDERLINE ECG

 

PREVIOUS TRACING       : 10/14/2009 15.17

 

DOCTOR:   Freddie Camarena  Interpretating Date/Time  04/08/2018 16:45:01